# Patient Record
Sex: MALE | Race: WHITE
[De-identification: names, ages, dates, MRNs, and addresses within clinical notes are randomized per-mention and may not be internally consistent; named-entity substitution may affect disease eponyms.]

---

## 2021-12-19 ENCOUNTER — HOSPITAL ENCOUNTER (EMERGENCY)
Dept: HOSPITAL 50 - VM.ED | Age: 81
Discharge: HOME | End: 2021-12-19
Payer: MEDICARE

## 2021-12-19 DIAGNOSIS — I63.9: Primary | ICD-10-CM

## 2021-12-19 DIAGNOSIS — J44.9: ICD-10-CM

## 2021-12-19 DIAGNOSIS — E11.9: ICD-10-CM

## 2021-12-19 LAB
ANION GAP SERPL CALC-SCNC: 12 MMOL/L (ref 5–15)
CHLORIDE SERPL-SCNC: 107 MMOL/L (ref 98–107)
SODIUM SERPL-SCNC: 143 MMOL/L (ref 136–145)

## 2021-12-19 PROCEDURE — 70496 CT ANGIOGRAPHY HEAD: CPT

## 2021-12-19 PROCEDURE — 84443 ASSAY THYROID STIM HORMONE: CPT

## 2021-12-19 PROCEDURE — 84484 ASSAY OF TROPONIN QUANT: CPT

## 2021-12-19 PROCEDURE — 86140 C-REACTIVE PROTEIN: CPT

## 2021-12-19 PROCEDURE — 80307 DRUG TEST PRSMV CHEM ANLYZR: CPT

## 2021-12-19 PROCEDURE — 70450 CT HEAD/BRAIN W/O DYE: CPT

## 2021-12-19 PROCEDURE — 93005 ELECTROCARDIOGRAM TRACING: CPT

## 2021-12-19 PROCEDURE — 80053 COMPREHEN METABOLIC PANEL: CPT

## 2021-12-19 PROCEDURE — 84100 ASSAY OF PHOSPHORUS: CPT

## 2021-12-19 PROCEDURE — 85025 COMPLETE CBC W/AUTO DIFF WBC: CPT

## 2021-12-19 PROCEDURE — 83735 ASSAY OF MAGNESIUM: CPT

## 2021-12-19 PROCEDURE — 36415 COLL VENOUS BLD VENIPUNCTURE: CPT

## 2021-12-19 PROCEDURE — 85610 PROTHROMBIN TIME: CPT

## 2021-12-19 PROCEDURE — 99284 EMERGENCY DEPT VISIT MOD MDM: CPT

## 2021-12-19 NOTE — EDM.PDOC
ED HPI GENERAL MEDICAL PROBLEM





- General


Stated Complaint: NO FEELING IN LEFT ARM (NUMB)


Time Seen by Provider: 12/19/21 18:37





- History of Present Illness


INITIAL COMMENTS - FREE TEXT/NARRATIVE: 





Pt. presents to ER with complaints of vertigo and lack of coordination in L 

upper extremity. Pt. states that he noticed the symptoms at around 11:00 this 

AM. He states that he had to drive across a portion of the state and when he 

stopped to get something from his wallet, he had troubles removing the object. 

He states that he also notes problems with balance, particularly when he closes 

his eyes. He was witnessed stumbling in ER when he removed a shirt over his 

head.  Denies any numbness in the extremity. Denies any facial droop. He has not

had any problems with dysarthria or aphasia.


Pt. denies any recent head trauma. No neck pain. 


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 Denies any acute vision loss or

change.


Pt. denies any history of cerebrovascular disease. He states that he has a 

history of diabetes, asthma, and COPD, but is unable to recall the names. He is 

not currently anticoagulated. 


Onset: Today


Onset Date: 12/19/21


Location: Reports: Head, Generalized





- Related Data


                                    Allergies











Allergy/AdvReac Type Severity Reaction Status Date / Time


 


No Known Allergies Allergy   Verified 12/19/21 18:11














ED ROS GENERAL





- Review of Systems


Review Of Systems: See Below


Constitutional: Reports: No Symptoms


HEENT: Reports: No Symptoms


Respiratory: Reports: No Symptoms


Cardiovascular: Reports: No Symptoms


Endocrine: Reports: No Symptoms


GI/Abdominal: Reports: No Symptoms


: Reports: No Symptoms


Musculoskeletal: Reports: No Symptoms


Skin: Reports: No Symptoms


Neurological: Reports: Dizziness, Other (lack of coordination, L upper 

extremity.).  Denies: Confusion, Headache, Numbness, Paresthesia, Pre-Existing 

Deficit, Seizure, Syncope, Tingling, Tremors, Trouble Speaking, Difficulty 

Walking, Weakness, Change in Speech, Gait Disturbance


Psychiatric: Reports: No Symptoms


Hematologic/Lymphatic: Reports: No Symptoms


Immunologic: Reports: No Symptoms





ED EXAM, GENERAL





- Physical Exam


Exam: See Below


Exam Limited By: No Limitations


General Appearance: Alert, WD/WN, No Apparent Distress


Eye Exam: Bilateral Eye: EOMI


Head: Atraumatic, Normocephalic


Neck: Normal Inspection, Supple, Non-Tender, Full Range of Motion


Respiratory/Chest: No Respiratory Distress, Lungs Clear, No Accessory Muscle 

Use, Chest Non-Tender, Decreased Breath Sounds


Cardiovascular: Regular Rate, Rhythm, No Edema, No JVD, No Murmur, No Rub


Peripheral Pulses: 4+: Radial (L)


GI/Abdominal: Soft, Non-Tender, No Distention, No Mass


 (Male) Exam: Deferred


Rectal (Males) Exam: Deferred


Back Exam: Normal Inspection, Full Range of Motion


Extremities: Normal Inspection, Normal Range of Motion, Non-Tender, No Pedal 

Edema, Normal Capillary Refill


Neurological: Alert, Oriented, CN II-XII Intact, Normal Cognition, Normal Gait, 

Normal Reflexes, No Motor/Sensory Deficits, Sensory/Motor Deficit, Other (lack 

of coordination with finger to nose L upper extremity)


Psychiatric: Normal Affect, Normal Mood


Skin Exam: Warm, Dry, Intact, Normal Color, No Rash


  ** #1 Interpretation


Rhythm: NSR


Axis: Normal


P-Wave: Present


QRS: Normal


ST-T: Normal


QT: Normal





Course





- Vital Signs


Last Recorded V/S: 


                                Last Vital Signs











Temp  37.0 C   12/19/21 18:37


 


Pulse  73   12/19/21 18:37


 


Resp  18   12/19/21 18:37


 


BP  137/78   12/19/21 18:37


 


Pulse Ox  97   12/19/21 18:37














- Orders/Labs/Meds


Orders: 


                               Active Orders 24 hr











 Category Date Time Status


 


 Ang Head [CT] Stat Exams  12/19/21 19:51 Ordered


 


 Ang Neck [CT] Stat Exams  12/19/21 19:52 Ordered


 


 Sodium Chloride 0.9% [Saline Flush] Med  12/19/21 18:12 Active





 10 ml FLUSH ASDIRECTED PRN   


 


 Peripheral IV Insertion Adult [OM.PC] Routine Oth  12/19/21 18:12 Ordered








                                Medication Orders





Sodium Chloride (Sodium Chloride 0.9% 10 Ml Syringe)  10 ml FLUSH ASDIRECTED PRN


   PRN Reason: Keep Vein Open








Labs: 


                                Laboratory Tests











  12/19/21 12/19/21 12/19/21 Range/Units





  18:20 18:20 18:27 


 


WBC  7.0    (4.0-10.0)  x10^3/uL


 


RBC  4.63    (4.5-6.0)  x10^6/uL


 


Hgb  14.3    (14.0-18.0)  g/dL


 


Hct  42.7    (40.0-52.0)  %


 


MCV  92.2    (78.0-93.0)  fL


 


MCH  30.9    (26.0-32.0)  pg


 


MCHC  33.5    (32.0-36.0)  g/dL


 


RDW Coeff of Yasmeen  13.2    (10.0-15.0)  %


 


Plt Count  218    (130-400)  x10^3/uL


 


Immature Gran % (Auto)  0.40    (0.00-0.43)  %


 


Neut % (Auto)  52.1    (50.0-80.0)  %


 


Lymph % (Auto)  29.3    (25.0-50.0)  %


 


Mono % (Auto)  12.3 H    (2.0-11.0)  %


 


Eos % (Auto)  5.3 H    (0.0-4.0)  %


 


Baso % (Auto)  0.6    (0.2-1.2)  %


 


Neut # (Auto)  3.7    (1.8-7.7)  x10^3/uL


 


Lymph # (Auto)  2.1    (1.0-4.8)  x10^3/uL


 


Mono # (Auto)  0.9 H    (0.0-0.8)  x10^3/uL


 


Eos # (Auto)  0.4    (0.0-0.5)  x10^3/uL


 


Baso # (Auto)  0.0    (0.0-0.2)  x10^3/uL


 


Immature Gran # (Auto)  0.03    (0.00-0.07)  x10^3/uL


 


PT    9.9  (9.9-12.5)  SEC


 


INR    0.9 L  (2.0-3.5)  


 


Sodium   143   (136-145)  mmol/L


 


Potassium   4.0   (3.5-5.1)  mmol/L


 


Chloride   107   ()  mmol/L


 


Carbon Dioxide   28   (21-32)  mmol/L


 


Anion Gap   12.0   (5-15)  mmol/L


 


BUN   26 H   (7-18)  mg/dL


 


Creatinine   1.0   (0.70-1.30)  mg/dL


 


Est Cr Clr Drug Dosing   65.74   mL/min


 


Estimated GFR (MDRD)   > 60   


 


Glucose   135 H   (70-99)  mg/dL


 


Calcium   9.4   (8.5-10.1)  mg/dL


 


Corrected Calcium   10.0   (8.5-10.1)  mg/dL


 


Phosphorus   2.4 L   (2.6-4.7)  mg/dL


 


Magnesium   1.9   (1.8-2.4)  mg/dL


 


Total Bilirubin   0.2   (0.2-1.0)  mg/dL


 


AST   12 L   (15-37)  U/L


 


ALT   14 L   (16-63)  U/L


 


Alkaline Phosphatase   72   ()  U/L


 


Troponin I High Sens   5   (<=76)  ng/L


 


C-Reactive Protein   < 0.2   (<=0.9)  mg/dL


 


Total Protein   6.6   (6.4-8.2)  g/dL


 


Albumin   3.3 L   (3.4-5.0)  g/dL


 


Globulin   3.3   


 


Albumin/Globulin Ratio   1.00   


 


TSH, Ultra Sensitive   1.477   (0.358-3.74)  uIU/mL


 


Ethyl Alcohol   < 3   (0-3)  mg/dL











Meds: 


Medications











Generic Name Dose Route Start Last Admin





  Trade Name Freq  PRN Reason Stop Dose Admin


 


Sodium Chloride  10 ml  12/19/21 18:12 





  Sodium Chloride 0.9% 10 Ml Syringe  FLUSH  





  ASDIRECTED PRN  





  Keep Vein Open  














Discontinued Medications














Generic Name Dose Route Start Last Admin





  Trade Name Freq  PRN Reason Stop Dose Admin


 


Aspirin  324 mg  12/19/21 20:01  12/19/21 20:10





  Aspirin 81 Mg Tab.Chew  PO  12/19/21 20:02  324 mg





  ONETIME ONE   Administration


 


Clopidogrel Bisulfate  75 mg  12/19/21 20:01  12/19/21 20:10





  Clopidogrel 75 Mg Tab  PO  12/19/21 20:02  75 mg





  ONETIME ONE   Administration














- Radiology Interpretation


Free Text/Narrative:: 





CT brain without contrast negative for acute pathology.





CTA head and neck obtained. Scattered calcified plaque in the bilateral 

intracranial internal carotid arteries and R vertebral artery without 

significant stenosis. Otherwise, patent head CTA. 


Less than 50% stenosis on the R, no significant stenosis on the left in the neck

 by NASCET criteria.








Departure





- Departure


Time of Disposition: 21:21


Disposition: Home, Self-Care 01


Clinical Impression: 


 CVA, Cerebrovascular accident








- Discharge Information


Instructions:  Stroke Prevention, Clopidogrel tablets, Rehabilitation After a 

Stroke, Adult, Ischemic Stroke, Aspirin, ASA oral tablets


Referrals: 


Bryanna Pastrana DO [Primary Care Provider] - 


Forms:  ED Department Discharge


Additional Instructions: 


I spoke with Dr. Jarquin, the neurologist at [facility]. He feels you have had a

stroke. He suggests the following medications:





Plavix 75mg


1 tab daily





Aspirin 81mg


1 tab daily





I will be in contact with [facility] to get you set up for an 

echocardiogram of your heart, an MRI of your brain, an ultrasound of the vessels

in our neck, and referrals to neurology and physical therapy. 





Return to ER if you have increased weakness, troubles walking, speaking, or 

seeing. It is very important that you come in as soon as the symptoms start, as 

we can give you medication for a severe stroke in the symptoms onset is within a

certain timeframe (ASAP, ideally within 3 hours, but come in anyway).











Sepsis Event Note (ED)





- Focused Exam


Vital Signs: 


                                   Vital Signs











  Temp Pulse Resp BP Pulse Ox


 


 12/19/21 18:37  37.0 C  73  18  137/78  97














- Problem List Review


Problem List Initiated/Reviewed/Updated: Yes





- My Orders


Last 24 Hours: 


My Active Orders





12/19/21 18:12


Sodium Chloride 0.9% [Saline Flush]   10 ml FLUSH ASDIRECTED PRN 


Peripheral IV Insertion Adult [OM.PC] Routine 





12/19/21 19:51


Ang Head [CT] Stat 





12/19/21 19:52


Ang Neck [CT] Stat 














- Assessment/Plan


Last 24 Hours: 


My Active Orders





12/19/21 18:12


Sodium Chloride 0.9% [Saline Flush]   10 ml FLUSH ASDIRECTED PRN 


Peripheral IV Insertion Adult [OM.PC] Routine 





12/19/21 19:51


Ang Head [CT] Stat 





12/19/21 19:52


Ang Neck [CT] Stat 











Plan: 





Case discussed with Dr. Jarquin. Pt. is not a candidate for TPA, primarily due

 to being out of time window, but also due to minimal severity of symptoms. 

Advised starting patient on plavix and aspirin. He was set up for outpatient 

echo, MRI of brain, carotid US, neuro referral, and physical therapy. Discussed 

the importance of seeking medical attention immediately on onset of symptoms, 

particularly problems with speech, ambulation, unilateral weakness, vision loss 

or change. None known

## 2021-12-19 NOTE — CT
______________________________________________________________________________   

  

[id number] CT/CT Head WO IV  

EXAM: CT Head WO IV  

   

 CLINICAL DATA: LEFT ARM WEAKNESS  

   

 COMPARISON STUDY: None  

   

 FINDINGS:  

   

 No intracranial hemorrhage, extra-axial fluid collection, mass, or acute  

 ischemia. No hydrocephalus.  

   

 Calvarium intact.  

   

 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  

   

 Paranasal sinus mucosal thickening. Partial opacification of the left mastoid  

 air cells.  

    

 IMPRESSION:  

   

 No acute intracranial findings.  

   

 Electronically signed by Chava Yoo MD on 12/19/2021 7:21 PM  

   

  

Chava Yoo MD                 

 12/19/21 1923    

  

Thank you for allowing us to participate in the care of your patient.

## 2021-12-20 NOTE — CT
______________________________________________________________________________   

  

[id number] CT/CTA Head   Neck  

EXAM: CT angiogram head and neck  

   

 INDICATION: DECREASED COORDINATION  

   

 COMPARISON: None.  

   

 DISCUSSION:  

 Aortic arch: Aortic and origins of the great vessels were not included in the  

 field-of-view of this study. There is hard plaque at the origin of the right  

 subclavian artery and at the origin of the left subclavian with less than 50%  

 stenosis.  

 Right carotid artery: Hard plaque at the bifurcation and in the cavernous  

 segment of the internal carotid artery without significant stenosis. No  

 dissection, aneurysm acute findings.  

 Left carotid artery: Plaque in the cavernous segment of the ICA without  

 significant stenosis. The internal common carotid arteries are otherwise normal  

 in appearance.  

 Right vertebral artery: Scattered hard plaque involving the intracranial segment  

 without significant stenosis, dissection or other acute findings.  

 Left vertebral artery: Diminutive in caliber ending in PICA. No acute  

 dissection, aneurysmal dilation or focal stenosis is identified.  

   

 Basilar artery: Normal in caliber. No significant stenosis or other abnormality.  

 [place] of [place]: Conventional morphology. No vessel cut off, significant  

 stenosis, aneurysm or vascular malformation is identified.  

   

 Dural sinuses, jugular veins and cerebral veins: Limited evaluation of the  

 cerebral veins, dural sinuses and jugular veins is unremarkable.  

   

 Brain parenchyma: Unremarkable.  



 The neck soft tissues: Unremarkable.  

 Osseous structures: Disc degeneration C4-C5 and C5-C6.  

   

 IMPRESSION:   

 1.  No acute findings.  

 2.  Scattered atherosclerotic plaque in both carotid systems and the right  

 vertebral artery without shift and stenosis.  

   

 Electronically signed by Marlo Camara MD on 12/20/2021 8:44 AM  

   

  

Marlo Camara MD                 

 12/20/21 2352    

  

Thank you for allowing us to participate in the care of your patient.

## 2022-02-11 ENCOUNTER — HOSPITAL ENCOUNTER (EMERGENCY)
Dept: HOSPITAL 50 - VM.ED | Age: 82
Discharge: HOME | End: 2022-02-11
Payer: MEDICARE

## 2022-02-11 DIAGNOSIS — L97.421: ICD-10-CM

## 2022-02-11 DIAGNOSIS — J45.909: ICD-10-CM

## 2022-02-11 DIAGNOSIS — Z79.84: ICD-10-CM

## 2022-02-11 DIAGNOSIS — E11.621: Primary | ICD-10-CM

## 2022-02-11 DIAGNOSIS — Z79.899: ICD-10-CM

## 2022-02-11 LAB
ANION GAP SERPL CALC-SCNC: 13 MMOL/L (ref 5–15)
CHLORIDE SERPL-SCNC: 105 MMOL/L (ref 98–107)
SODIUM SERPL-SCNC: 139 MMOL/L (ref 136–145)

## 2023-01-03 ENCOUNTER — HOSPITAL ENCOUNTER (EMERGENCY)
Dept: HOSPITAL 50 - VM.ED | Age: 83
LOS: 1 days | Discharge: HOME | End: 2023-01-04
Payer: MEDICARE

## 2023-01-03 DIAGNOSIS — E11.9: ICD-10-CM

## 2023-01-03 DIAGNOSIS — Z86.73: ICD-10-CM

## 2023-01-03 DIAGNOSIS — Z79.02: ICD-10-CM

## 2023-01-03 DIAGNOSIS — Z79.82: ICD-10-CM

## 2023-01-03 DIAGNOSIS — N13.2: Primary | ICD-10-CM

## 2023-01-03 LAB
ANION GAP SERPL CALC-SCNC: 13 MMOL/L (ref 5–15)
APTT PPP: 25.2 SEC (ref 20.5–30.9)
CHLORIDE SERPL-SCNC: 108 MMOL/L (ref 98–107)
EGFRCR SERPLBLD CKD-EPI 2021: 47 ML/MIN (ref 60–?)
SODIUM SERPL-SCNC: 143 MMOL/L (ref 136–145)

## 2023-01-03 PROCEDURE — 85610 PROTHROMBIN TIME: CPT

## 2023-01-03 PROCEDURE — 99284 EMERGENCY DEPT VISIT MOD MDM: CPT

## 2023-01-03 PROCEDURE — 83735 ASSAY OF MAGNESIUM: CPT

## 2023-01-03 PROCEDURE — 85025 COMPLETE CBC W/AUTO DIFF WBC: CPT

## 2023-01-03 PROCEDURE — 36415 COLL VENOUS BLD VENIPUNCTURE: CPT

## 2023-01-03 PROCEDURE — 85730 THROMBOPLASTIN TIME PARTIAL: CPT

## 2023-01-03 PROCEDURE — 74176 CT ABD & PELVIS W/O CONTRAST: CPT

## 2023-01-03 PROCEDURE — 80053 COMPREHEN METABOLIC PANEL: CPT

## 2023-01-03 PROCEDURE — 81001 URINALYSIS AUTO W/SCOPE: CPT

## 2023-01-03 PROCEDURE — 83690 ASSAY OF LIPASE: CPT

## 2023-01-03 PROCEDURE — 86140 C-REACTIVE PROTEIN: CPT

## 2023-01-03 PROCEDURE — 82150 ASSAY OF AMYLASE: CPT

## 2023-01-03 PROCEDURE — 84100 ASSAY OF PHOSPHORUS: CPT

## 2023-01-04 RX ADMIN — HYDROCODONE BITARTRATE AND ACETAMINOPHEN ONE PACKET: 5; 325 TABLET ORAL at 00:45
